# Patient Record
Sex: FEMALE | Race: WHITE | NOT HISPANIC OR LATINO | Employment: UNEMPLOYED | URBAN - METROPOLITAN AREA
[De-identification: names, ages, dates, MRNs, and addresses within clinical notes are randomized per-mention and may not be internally consistent; named-entity substitution may affect disease eponyms.]

---

## 2021-04-06 ENCOUNTER — BULK ORDERING (OUTPATIENT)
Dept: CASE MANAGEMENT | Facility: OTHER | Age: 46
End: 2021-04-06

## 2021-04-06 DIAGNOSIS — Z23 IMMUNIZATION DUE: ICD-10-CM

## 2022-02-16 ENCOUNTER — TELEPHONE (OUTPATIENT)
Dept: CARDIOLOGY | Facility: CLINIC | Age: 47
End: 2022-02-16

## 2022-02-16 NOTE — TELEPHONE ENCOUNTER
Please notify pt that I have moved her appt to 2/18/2022 at 9am in [place]. Please find out if anyone has done an EKG on her and let me know so I can request a copy. Thanks,  Neida

## 2022-02-16 NOTE — TELEPHONE ENCOUNTER
22-Dr Laboy  Pt: Bri Yumi  :1975  Phone: [phone]  Reason for Call:  Pt. Has appt. On  as a new pt. With Dr Laboy. She called today after seeing her PCP and request to be moved up. She states that she is having chest pain and tightness almost every day. I told her that you would have to bump her up if you could because I see no open new pt spots. Let me know.

## 2022-02-17 RX ORDER — LORAZEPAM 2 MG/1
1 TABLET ORAL DAILY
COMMUNITY
Start: 2022-01-26

## 2022-02-17 RX ORDER — ESTRADIOL 0.75 MG/1.25G
1.25 GEL, METERED TOPICAL DAILY
COMMUNITY

## 2022-02-17 RX ORDER — PREDNISONE 5 MG/1
TABLET ORAL
COMMUNITY
Start: 2022-02-02 | End: 2022-02-18

## 2022-02-18 ENCOUNTER — OFFICE VISIT (OUTPATIENT)
Dept: CARDIOLOGY | Facility: CLINIC | Age: 47
End: 2022-02-18

## 2022-02-18 ENCOUNTER — PATIENT ROUNDING (BHMG ONLY) (OUTPATIENT)
Dept: CARDIOLOGY | Facility: CLINIC | Age: 47
End: 2022-02-18

## 2022-02-18 ENCOUNTER — TELEPHONE (OUTPATIENT)
Dept: CARDIOLOGY | Facility: CLINIC | Age: 47
End: 2022-02-18

## 2022-02-18 VITALS
HEIGHT: 65 IN | OXYGEN SATURATION: 99 % | HEART RATE: 96 BPM | SYSTOLIC BLOOD PRESSURE: 110 MMHG | BODY MASS INDEX: 30.82 KG/M2 | DIASTOLIC BLOOD PRESSURE: 68 MMHG | RESPIRATION RATE: 18 BRPM | WEIGHT: 185 LBS

## 2022-02-18 DIAGNOSIS — R07.89 OTHER CHEST PAIN: Primary | ICD-10-CM

## 2022-02-18 DIAGNOSIS — I40.0 SUBACUTE VIRAL MYOCARDITIS: ICD-10-CM

## 2022-02-18 PROCEDURE — 99202 OFFICE O/P NEW SF 15 MIN: CPT | Performed by: INTERNAL MEDICINE

## 2022-02-18 RX ORDER — MULTIVIT WITH MINERALS/LUTEIN
250 TABLET ORAL DAILY
COMMUNITY

## 2022-02-18 RX ORDER — ASPIRIN 81 MG/1
81 TABLET, CHEWABLE ORAL 3 TIMES DAILY
COMMUNITY

## 2022-02-18 RX ORDER — OMEGA-3S/DHA/EPA/FISH OIL/D3 300MG-1000
400 CAPSULE ORAL DAILY
COMMUNITY

## 2022-02-18 RX ORDER — PROGESTERONE 100 MG/1
100 CAPSULE ORAL DAILY
COMMUNITY

## 2022-02-18 NOTE — PROGRESS NOTES
"February 18, 2022    Hello, may I speak with Bri Eason?    My name is Jean Paul cunningham     I am  with [facility]  [address]  [facility] [address] [phone].    Before we get started may I verify your date of birth? 1975    I am calling to officially welcome you to our practice and ask about your recent visit. Is this a good time to talk? yes    Tell me about your visit with us. What things went well?  \"I thought the visit went very well, the staff was professional and courteous. I didn't wait long and Dr. Laboy was great. His MA was very thorough and kind. I like how I didn't feel rushed and how he explained everything really well.\"        We're always looking for ways to make our patients' experiences even better. Do you have recommendations on ways we may improve?  \"yes, Dr. Laboy wanted to go ahead and be the most efficient and get an MRI to rule out a heart problem, but I feel this may be too much too soon monetarily wise. I would maybe try a few other less expensive tests first before going with such an expensive test.\"    Overall were you satisfied with your first visit to our practice? yes       I appreciate you taking the time to speak with me today. Is there anything else I can do for you? no      Thank you, and have a great day.      "

## 2022-02-18 NOTE — PROGRESS NOTES
Subjective:     Encounter Date:2022      Patient ID: Bri Eason is a 46 y.o. female.    Chief Complaint:  Chief Complaint   Patient presents with   • Establish Care   • Chest Pain   • Shortness of Breath       HPI:  46-year-old female patient with no cardiac history and no coronary artery risk factors who presents for evaluation of chest pain shortness of air.    Of note I personally reviewed her coronary calcium CT scan from 2021 which showed a 0 score with otherwise no anatomical abnormalities found.  Also reviewed her ECG from that same time which showed normal sinus rhythm and normal tracing.    She comes in complaining of chest pain that is atypical that does worsen with her menses with associated exertional dyspnea.  Her chest pain is atypical in that it is worsened with leaning over is worsened with anxiety is not associated necessarily with dyspnea but there is an increasing exertional dyspnea that she notes over the past 2 months.  Her pain is better with lying flat.    The following portions of the patient's history were reviewed and updated as appropriate: allergies, current medications, past family history, past medical history, past social history, past surgical history and problem list.    Problem List:  Patient Active Problem List   Diagnosis   • Other chest pain   • Subacute viral myocarditis       Past Medical History:  Past Medical History:   Diagnosis Date   • COVID-19    • GERD (gastroesophageal reflux disease)    • Other chest pain 2022   • Subacute viral myocarditis 2022       Past Surgical History:  Past Surgical History:   Procedure Laterality Date   •  SECTION         Social History:  Social History     Socioeconomic History   • Marital status:    Tobacco Use   • Smoking status: Never Smoker   • Smokeless tobacco: Never Used   Vaping Use   • Vaping Use: Never used   Substance and Sexual Activity   • Alcohol use: Not Currently   • Drug use: Never  "  • Sexual activity: Defer       Allergies:  Allergies   Allergen Reactions   • Morphine Itching           Review of Symptoms:  Constitutional: Patient afebrile no chills or unexpected weight changes  Respiratory: No cough, no wheezing or dyspnea  Cardiovascular: Today the patient complains of mild chest pain, but no palpitations, dyspnea, orthopnea and no edema  Gastrointestinal: No nausea, vomiting, constipation or diarrhea.  No melena or dark stools    All other systems reviewed and are negative           Objective:         /68   Pulse 96   Resp 18   Ht 165.1 cm (65\")   Wt 83.9 kg (185 lb)   SpO2 99%   BMI 30.79 kg/m²       Physical exam  Constitutional: well-nourished, and appears stated age in no acute distress  PERRL: Conjunctiva clear, no pallor, anicteric  HENMT: normocephalic, normal dentition, no cyanosis or pallor  Neck:no bruits, or thrills and bilateral normal carotid upstroke. Normal jugular venous pressure  Cardiovascular: No parasternal heaves an non-displaced focal PMI. Normal rate and rhythm: no rub, gallop, murmur or click and normal S1 and S2; no lower or upper extremity edema.   Lungs: unlabored, no wheezing with no rales or rhonchi on auscultation.  Extremities: Warm, no clubbing, cyanosis. Full and equal peripheral pulses in extremities with no bruits appreciated.   Abdomen: soft, non-tender, non-distended  Musculoskeletal: no joint tenderness or swelling and no erythema  Skin: Warm and dry, non-erythematous   Neuro:alert and normal affect. Oriented to time, place and person.       In-Office Procedure(s):  Procedures    ASCVD RIsk Score::  The ASCVD Risk score (Ramu DC Jr., et al., 2013) failed to calculate for the following reasons:    Cannot find a previous HDL lab    Cannot find a previous total cholesterol lab    Recent Radiology:  Imaging Results (Most Recent)     None          Lab Review:   No visits with results within 2 Month(s) from this visit.   Latest known visit with " results is:   Admission on 11/30/2020, Discharged on 11/30/2020   Component Date Value   • SARS-CoV-2, KO 11/30/2020 Not Detected               Invalid input(s): ALKPO4                        Invalid input(s): LDLCALC                Assessment:          Diagnosis Plan   1. Other chest pain     2. Subacute viral myocarditis            Plan:         1. Other chest pain  We will evaluate for myocarditis as this is in the differential diagnosis although gastroesophageal reflux disease may also be the etiology    2. Subacute viral myocarditis  Given the above we will perform cardiac MRI.         Mick Laboy MD  02/18/22  .

## 2022-02-18 NOTE — TELEPHONE ENCOUNTER
Patient would like to hold off on the MRI at this time. Patient will call back when she is ready to schedule, please close order.     Thanks,   Jean Paul

## 2022-04-27 ENCOUNTER — TELEPHONE (OUTPATIENT)
Dept: CARDIOLOGY | Facility: CLINIC | Age: 47
End: 2022-04-27

## 2022-04-27 DIAGNOSIS — R00.2 PALPITATIONS: Primary | ICD-10-CM

## 2022-05-03 ENCOUNTER — APPOINTMENT (OUTPATIENT)
Dept: MRI IMAGING | Facility: HOSPITAL | Age: 47
End: 2022-05-03

## 2022-05-26 NOTE — TELEPHONE ENCOUNTER
Patient having a lot of trouble sleeping due to chest pain and palpitations. Patient states that her HR is around 100 while laying down. She is also complaining of feeling her heart stop beating for brief moments. Patient was unable to have MRI due to insurance.     Patient is requesting to speak to Dr. Laboy regarding her care plan.     Please adviseJean Paul